# Patient Record
Sex: FEMALE | Race: WHITE | NOT HISPANIC OR LATINO | RURAL
[De-identification: names, ages, dates, MRNs, and addresses within clinical notes are randomized per-mention and may not be internally consistent; named-entity substitution may affect disease eponyms.]

---

## 2017-12-14 ENCOUNTER — APPOINTMENT (RX ONLY)
Dept: RURAL CLINIC 3 | Facility: CLINIC | Age: 82
Setting detail: DERMATOLOGY
End: 2017-12-14

## 2017-12-14 DIAGNOSIS — L21.8 OTHER SEBORRHEIC DERMATITIS: ICD-10-CM

## 2017-12-14 DIAGNOSIS — L30.9 DERMATITIS, UNSPECIFIED: ICD-10-CM

## 2017-12-14 PROCEDURE — 11100: CPT

## 2017-12-14 PROCEDURE — ? PRESCRIPTION

## 2017-12-14 PROCEDURE — ? EDUCATIONAL RESOURCES PROVIDED

## 2017-12-14 PROCEDURE — ? PATIENT SPECIFIC COUNSELING

## 2017-12-14 PROCEDURE — ? TREATMENT REGIMEN

## 2017-12-14 PROCEDURE — ? BIOPSY BY PUNCH METHOD

## 2017-12-14 PROCEDURE — ? OTHER

## 2017-12-14 PROCEDURE — ? COUNSELING

## 2017-12-14 PROCEDURE — 99202 OFFICE O/P NEW SF 15 MIN: CPT | Mod: 25

## 2017-12-14 RX ORDER — CLOBETASOL PROPIONATE 0.5 MG/G
CREAM TOPICAL
Qty: 1 | Refills: 1 | Status: ERX | COMMUNITY
Start: 2017-12-14

## 2017-12-14 RX ADMIN — CLOBETASOL PROPIONATE: 0.5 CREAM TOPICAL at 00:00

## 2017-12-14 ASSESSMENT — LOCATION DETAILED DESCRIPTION DERM
LOCATION DETAILED: RIGHT SUPERIOR MEDIAL UPPER BACK
LOCATION DETAILED: RIGHT PROXIMAL LATERAL POSTERIOR THIGH
LOCATION DETAILED: POSTERIOR MID-PARIETAL SCALP
LOCATION DETAILED: LEFT POSTERIOR SHOULDER
LOCATION DETAILED: RIGHT DISTAL POSTERIOR THIGH

## 2017-12-14 ASSESSMENT — LOCATION SIMPLE DESCRIPTION DERM
LOCATION SIMPLE: LEFT SHOULDER
LOCATION SIMPLE: RIGHT POSTERIOR THIGH
LOCATION SIMPLE: RIGHT UPPER BACK
LOCATION SIMPLE: POSTERIOR SCALP

## 2017-12-14 ASSESSMENT — LOCATION ZONE DERM
LOCATION ZONE: SCALP
LOCATION ZONE: TRUNK
LOCATION ZONE: LEG
LOCATION ZONE: ARM

## 2017-12-14 NOTE — PROCEDURE: OTHER
Detail Level: Zone
Other (Free Text): Recheck in 3 weeks.
Note Text (......Xxx Chief Complaint.): This diagnosis correlates with the

## 2017-12-14 NOTE — PROCEDURE: PATIENT SPECIFIC COUNSELING
Patient reports she has Clobetasol solution at home and it works well for her. She was educated she may use this hid x 2 weeks, then reduce to 1-2 x week for maintenance, if needed.
Detail Level: Simple

## 2017-12-14 NOTE — PROCEDURE: BIOPSY BY PUNCH METHOD
Bill For Surgical Tray: no
Detail Level: Detailed
Notification Instructions: Patient will be notified of biopsy results. However, patient instructed to call the office if not contacted within 2 weeks.
Hemostasis: None
Anesthesia Type: 2% lidocaine with epinephrine
Wound Care: Polysporin ointment
Biopsy Type: H and E
Suture Removal: 14 days
Additional Anesthesia Volume In Cc (Will Not Render If 0): 0
Path Notes (To The Dermatopathologist): Two specimens were taken from the same lesion and placed into one specimen container.
Dressing: bandage
Post-Care Instructions: I reviewed with the patient in detail post-care instructions. Patient is to keep the biopsy site dry overnight, and then apply bacitracin twice daily until healed. Patient may apply hydrogen peroxide soaks to remove any crusting.
Epidermal Sutures: 4-0 Nylon
Consent: Verbal consent was obtained from patient.
Punch Size In Mm: 4
Anesthesia Volume In Cc: 1
Home Suture Removal Text: Patient was provided a home suture removal kit and will remove their sutures at home.  If they have any questions or difficulties they will call the office.
Billing Type: Third-Party Bill

## 2017-12-14 NOTE — PROCEDURE: TREATMENT REGIMEN
Detail Level: Simple
Otc Regimen: Daily moisturization with bland cream
Initiate Treatment: Clobetasol as prescribed
Samples Given: VANICREAM: Skin Cream, product list

## 2018-01-02 ENCOUNTER — APPOINTMENT (RX ONLY)
Dept: RURAL CLINIC 3 | Facility: CLINIC | Age: 83
Setting detail: DERMATOLOGY
End: 2018-01-02

## 2018-01-02 DIAGNOSIS — L20.89 OTHER ATOPIC DERMATITIS: ICD-10-CM

## 2018-01-02 DIAGNOSIS — H61.03 CHONDRITIS OF EXTERNAL EAR: ICD-10-CM

## 2018-01-02 DIAGNOSIS — Z71.89 OTHER SPECIFIED COUNSELING: ICD-10-CM

## 2018-01-02 DIAGNOSIS — L82.1 OTHER SEBORRHEIC KERATOSIS: ICD-10-CM

## 2018-01-02 DIAGNOSIS — L30.9 DERMATITIS, UNSPECIFIED: ICD-10-CM

## 2018-01-02 PROBLEM — L20.84 INTRINSIC (ALLERGIC) ECZEMA: Status: ACTIVE | Noted: 2018-01-02

## 2018-01-02 PROBLEM — H61.032 CHONDRITIS OF LEFT EXTERNAL EAR: Status: ACTIVE | Noted: 2018-01-02

## 2018-01-02 PROCEDURE — ? PATHOLOGY DISCUSSION

## 2018-01-02 PROCEDURE — ? PATIENT SPECIFIC COUNSELING

## 2018-01-02 PROCEDURE — ? EDUCATIONAL RESOURCES PROVIDED

## 2018-01-02 PROCEDURE — ? TREATMENT REGIMEN

## 2018-01-02 PROCEDURE — ? OTHER

## 2018-01-02 PROCEDURE — ? COUNSELING

## 2018-01-02 PROCEDURE — 99213 OFFICE O/P EST LOW 20 MIN: CPT

## 2018-01-02 ASSESSMENT — LOCATION DETAILED DESCRIPTION DERM
LOCATION DETAILED: LEFT SUPERIOR UPPER BACK
LOCATION DETAILED: RIGHT SUPERIOR UPPER BACK
LOCATION DETAILED: LEFT SUPERIOR LATERAL UPPER BACK
LOCATION DETAILED: LEFT CAVUM CONCHA
LOCATION DETAILED: RIGHT CAVUM CONCHA
LOCATION DETAILED: LEFT SUPERIOR HELIX

## 2018-01-02 ASSESSMENT — LOCATION ZONE DERM
LOCATION ZONE: TRUNK
LOCATION ZONE: EAR

## 2018-01-02 ASSESSMENT — LOCATION SIMPLE DESCRIPTION DERM
LOCATION SIMPLE: RIGHT UPPER BACK
LOCATION SIMPLE: LEFT UPPER BACK
LOCATION SIMPLE: LEFT EAR
LOCATION SIMPLE: RIGHT EAR

## 2018-01-02 NOTE — PROCEDURE: TREATMENT REGIMEN
Modify Regimen: Increase Clobetasol to bid x 2 weeks to legs, then reduce to 1-2 x week. Use 1-2 x week on back
Plan: Return to clinic if rash does not resolve or worsens
Detail Level: Zone
Initiate Treatment: Daily moisturization with bland cream

## 2018-01-02 NOTE — PROCEDURE: OTHER
Detail Level: Simple
Other (Free Text): Patient’s dermatitis on her back is improved/resolved. The dermatitis on her legs is still active.
Note Text (......Xxx Chief Complaint.): This diagnosis correlates with the

## 2018-01-02 NOTE — PROCEDURE: PATIENT SPECIFIC COUNSELING
Patient educated this is not a cyst.
Detail Level: Zone
Patient educated to use over-the-counter cortisone 10 in her ears to see if that resolves the itch. If she does not have cortisone 10, she may use any strength of over-the-counter hydrocortisone. Return to clinic if no improvement.
Patient educated to return to clinic for any concerning lesions.
Detail Level: Simple

## 2018-01-15 ENCOUNTER — APPOINTMENT (RX ONLY)
Dept: RURAL CLINIC 3 | Facility: CLINIC | Age: 83
Setting detail: DERMATOLOGY
End: 2018-01-15

## 2018-01-15 DIAGNOSIS — L30.9 DERMATITIS, UNSPECIFIED: ICD-10-CM

## 2018-01-15 PROCEDURE — ? TREATMENT REGIMEN

## 2018-01-15 PROCEDURE — ? COUNSELING

## 2018-01-15 PROCEDURE — ? BIOPSY BY PUNCH METHOD

## 2018-01-15 PROCEDURE — ? PATIENT SPECIFIC COUNSELING

## 2018-01-15 PROCEDURE — ? OTHER

## 2018-01-15 PROCEDURE — 11100: CPT

## 2018-01-15 ASSESSMENT — LOCATION DETAILED DESCRIPTION DERM
LOCATION DETAILED: RIGHT MID-UPPER BACK
LOCATION DETAILED: RIGHT INFERIOR LATERAL UPPER BACK
LOCATION DETAILED: LEFT INFERIOR UPPER BACK
LOCATION DETAILED: RIGHT INFERIOR UPPER BACK

## 2018-01-15 ASSESSMENT — LOCATION SIMPLE DESCRIPTION DERM
LOCATION SIMPLE: RIGHT UPPER BACK
LOCATION SIMPLE: LEFT UPPER BACK

## 2018-01-15 ASSESSMENT — LOCATION ZONE DERM: LOCATION ZONE: TRUNK

## 2018-01-15 NOTE — PROCEDURE: PATIENT SPECIFIC COUNSELING
Detail Level: Detailed
Patient educated to return to clinic in 2 weeks for an office visit to go over biopsy results and remove sutures. Should she flare next week, she should call and ask to speak with one of Dr. Bull’s nurses as Italia is out of the clinic.

## 2018-01-15 NOTE — PROCEDURE: OTHER
Note Text (......Xxx Chief Complaint.): This diagnosis correlates with the
Other (Free Text): A KOH prep take from patient’s right lateral midback was negative for hyphae.
Detail Level: Simple
Other (Free Text): Two specimens were taken and placed into one specimen container. Each site was closed with one 4-0 suture.

## 2018-01-15 NOTE — PROCEDURE: TREATMENT REGIMEN
Initiate Treatment: Oral Allegra when patient receives it (she ordered it)
Continue Regimen: Benadry qhs
Discontinue Regimen: Clobetasol, topical Allegra, hydroxyzine (patient educated it interacts with Benadryl). Avoid wearing bra as much as possible
Detail Level: Zone

## 2018-01-15 NOTE — PROCEDURE: BIOPSY BY PUNCH METHOD
Suture Removal: 14 days
Punch Size In Mm: 4
Additional Anesthesia Volume In Cc (Will Not Render If 0): 0
Bill 69870 For Specimen Handling/Conveyance To Laboratory?: no
Consent: Verbal consent was obtained from patient.
Anesthesia Type: 2% lidocaine with epinephrine
Anesthesia Volume In Cc: 1
Epidermal Sutures: 4-0 Nylon
Biopsy Type: H and E
Path Notes (To The Dermatopathologist): Rash worsened with Clobetasol. Patient had prior biopsy on 12/14/17. Two specimens were taken and placed into one specimen container.
Post-Care Instructions: I reviewed with the patient in detail post-care instructions. Patient is to keep the biopsy site dry overnight, and then apply bacitracin twice daily until healed. Patient may apply hydrogen peroxide soaks to remove any crusting.
Hemostasis: None
Dressing: bandage
Billing Type: Third-Party Bill
Notification Instructions: Patient will be notified of biopsy results. However, patient instructed to call the office if not contacted within 2 weeks.
Home Suture Removal Text: Patient was provided a home suture removal kit and will remove their sutures at home.  If they have any questions or difficulties they will call the office.
Detail Level: Detailed
Wound Care: Polysporin ointment

## 2018-01-18 ENCOUNTER — APPOINTMENT (RX ONLY)
Dept: RURAL CLINIC 3 | Facility: CLINIC | Age: 83
Setting detail: DERMATOLOGY
End: 2018-01-18

## 2018-01-18 DIAGNOSIS — L30.9 DERMATITIS, UNSPECIFIED: ICD-10-CM

## 2018-01-18 PROCEDURE — ? PATIENT SPECIFIC COUNSELING

## 2018-01-18 PROCEDURE — ? OTHER

## 2018-01-18 PROCEDURE — 99213 OFFICE O/P EST LOW 20 MIN: CPT

## 2018-01-18 PROCEDURE — ? TREATMENT REGIMEN

## 2018-01-18 PROCEDURE — ? ORDER TESTS

## 2018-01-18 PROCEDURE — ? COUNSELING

## 2018-01-18 ASSESSMENT — LOCATION DETAILED DESCRIPTION DERM
LOCATION DETAILED: RIGHT INFERIOR LATERAL UPPER BACK
LOCATION DETAILED: LEFT INFERIOR UPPER BACK
LOCATION DETAILED: RIGHT INFERIOR UPPER BACK
LOCATION DETAILED: RIGHT ANTERIOR SHOULDER

## 2018-01-18 ASSESSMENT — LOCATION ZONE DERM
LOCATION ZONE: TRUNK
LOCATION ZONE: ARM

## 2018-01-18 ASSESSMENT — LOCATION SIMPLE DESCRIPTION DERM
LOCATION SIMPLE: RIGHT UPPER BACK
LOCATION SIMPLE: RIGHT SHOULDER
LOCATION SIMPLE: LEFT UPPER BACK

## 2018-01-18 NOTE — PROCEDURE: OTHER
Other (Free Text): [facility] was called by Italia and asked to add “Urticarial Drug Eruption” to the differential diagnosis on patient’s 1/15/18 right back biopsy. A written request was faxed as well (copy scanned into chart).
Detail Level: Detailed
Note Text (......Xxx Chief Complaint.): This diagnosis correlates with the
Other (Free Text): Patient would like copies of her chart notes sent to [facility] Garcia will do this.

## 2018-01-18 NOTE — PROCEDURE: PATIENT SPECIFIC COUNSELING
Detail Level: Detailed
Patient aware LB is out of the office next week. Should patient flare while LB is gone, she should call the clinic. Dr. Bull will be in the office next week and also saw patient today.

## 2018-01-18 NOTE — PROCEDURE: TREATMENT REGIMEN
Continue Regimen: Allegra, one PO qd
Discontinue Regimen: Minocycline
Initiate Treatment: (Restart) clobetasol cream, AAA bid prn
Detail Level: Zone

## 2018-01-31 ENCOUNTER — APPOINTMENT (RX ONLY)
Dept: RURAL CLINIC 3 | Facility: CLINIC | Age: 83
Setting detail: DERMATOLOGY
End: 2018-01-31

## 2018-01-31 DIAGNOSIS — L27.0 GENERALIZED SKIN ERUPTION DUE TO DRUGS AND MEDICAMENTS TAKEN INTERNALLY: ICD-10-CM

## 2018-01-31 DIAGNOSIS — L28.1 PRURIGO NODULARIS: ICD-10-CM

## 2018-01-31 DIAGNOSIS — L21.8 OTHER SEBORRHEIC DERMATITIS: ICD-10-CM

## 2018-01-31 DIAGNOSIS — L73.8 OTHER SPECIFIED FOLLICULAR DISORDERS: ICD-10-CM

## 2018-01-31 PROCEDURE — ? OTHER

## 2018-01-31 PROCEDURE — ? PATIENT SPECIFIC COUNSELING

## 2018-01-31 PROCEDURE — 99213 OFFICE O/P EST LOW 20 MIN: CPT

## 2018-01-31 PROCEDURE — ? TREATMENT REGIMEN

## 2018-01-31 PROCEDURE — ? COUNSELING

## 2018-01-31 PROCEDURE — ? PATHOLOGY DISCUSSION

## 2018-01-31 ASSESSMENT — LOCATION SIMPLE DESCRIPTION DERM
LOCATION SIMPLE: RIGHT UPPER BACK
LOCATION SIMPLE: POSTERIOR SCALP
LOCATION SIMPLE: LEFT EAR
LOCATION SIMPLE: NOSE
LOCATION SIMPLE: POSTERIOR NECK

## 2018-01-31 ASSESSMENT — LOCATION ZONE DERM
LOCATION ZONE: NECK
LOCATION ZONE: TRUNK
LOCATION ZONE: SCALP
LOCATION ZONE: NOSE
LOCATION ZONE: EAR

## 2018-01-31 ASSESSMENT — LOCATION DETAILED DESCRIPTION DERM
LOCATION DETAILED: POSTERIOR MID-PARIETAL SCALP
LOCATION DETAILED: LEFT CAVUM CONCHA
LOCATION DETAILED: NASAL SUPRATIP
LOCATION DETAILED: LEFT SUPERIOR POSTERIOR NECK
LOCATION DETAILED: RIGHT INFERIOR UPPER BACK

## 2018-01-31 NOTE — PROCEDURE: TREATMENT REGIMEN
Detail Level: Zone
Continue Regimen: Allegra (topical, oral) prn. Daily moisturization with a bland cream
Discontinue Regimen: Minocycline, clobetasol

## 2018-01-31 NOTE — PROCEDURE: OTHER
Note Text (......Xxx Chief Complaint.): This diagnosis correlates with the
Detail Level: Detailed
Other (Free Text): Two sutures were removed.

## 2018-01-31 NOTE — PROCEDURE: PATIENT SPECIFIC COUNSELING
Patient’s scalp is improved. Her left ear still has a small amount if greasy scale in the eva cavum. She reports it itches but not as much as it used to since she has been putting over-the-counter cortisone on it. Recommend she continue over-the-counter cortisone bid x 2 weeks with flares, the. 1-2 x week for maintenance.
Detail Level: Detailed
Patient educated she is allergic to minocycline and to be sure she notifies her other providers of this. Her rash has mostly resolved with post-inflammatory change remaining. She will get a small patch of urticaria when she scratches her right lateral lower mid back. She was reassured this will resolve in time and to try to refrain from scratching. She reports Clobetasol doesn’t help it much but Allegra cream does. She may use Allegra cream prn. Also reviewed patient’s bloodwork. Most of it is normal or near normal with the exception of her TSH, which is elevated. Patient educated to follow-up with her primary care provider or Dr. Royal (patient reports Dr. Royal manages her thyroid) for further evaluation and management. Patient has an appointment with Dr. Royal tomorrow.
Detail Level: Simple

## 2023-05-16 ENCOUNTER — APPOINTMENT (RX ONLY)
Dept: RURAL CLINIC 2 | Facility: CLINIC | Age: 88
Setting detail: DERMATOLOGY
End: 2023-05-16

## 2023-05-16 DIAGNOSIS — L08.9 LOCAL INFECTION OF THE SKIN AND SUBCUTANEOUS TISSUE, UNSPECIFIED: ICD-10-CM

## 2023-05-16 DIAGNOSIS — L30.9 DERMATITIS, UNSPECIFIED: ICD-10-CM

## 2023-05-16 DIAGNOSIS — R20.2 PARESTHESIA OF SKIN: ICD-10-CM

## 2023-05-16 DIAGNOSIS — L259 CONTACT DERMATITIS AND OTHER ECZEMA, UNSPECIFIED CAUSE: ICD-10-CM

## 2023-05-16 PROBLEM — L30.8 OTHER SPECIFIED DERMATITIS: Status: ACTIVE | Noted: 2023-05-16

## 2023-05-16 PROCEDURE — ? OTHER

## 2023-05-16 PROCEDURE — ? PRESCRIPTION

## 2023-05-16 PROCEDURE — ? ADDITIONAL NOTES

## 2023-05-16 PROCEDURE — ? COUNSELING

## 2023-05-16 PROCEDURE — 99203 OFFICE O/P NEW LOW 30 MIN: CPT

## 2023-05-16 PROCEDURE — ? ORDER TESTS

## 2023-05-16 PROCEDURE — ? EDUCATIONAL RESOURCES PROVIDED

## 2023-05-16 PROCEDURE — ? PRESCRIPTION MEDICATION MANAGEMENT

## 2023-05-16 RX ORDER — CEPHALEXIN 500 MG/1
TABLET ORAL BID
Qty: 20 | Refills: 0 | Status: ERX | COMMUNITY
Start: 2023-05-16

## 2023-05-16 RX ORDER — TRIAMCINOLONE ACETONIDE 1 MG/G
CREAM TOPICAL
Qty: 160 | Refills: 0 | Status: ERX | COMMUNITY
Start: 2023-05-16

## 2023-05-16 RX ADMIN — TRIAMCINOLONE ACETONIDE: 1 CREAM TOPICAL at 00:00

## 2023-05-16 RX ADMIN — CEPHALEXIN: 500 TABLET ORAL at 00:00

## 2023-05-16 ASSESSMENT — LOCATION SIMPLE DESCRIPTION DERM
LOCATION SIMPLE: LEFT FOREARM
LOCATION SIMPLE: RIGHT THIGH
LOCATION SIMPLE: UPPER BACK
LOCATION SIMPLE: LEFT INDEX FINGER
LOCATION SIMPLE: RIGHT UPPER BACK
LOCATION SIMPLE: LOWER BACK

## 2023-05-16 ASSESSMENT — LOCATION DETAILED DESCRIPTION DERM
LOCATION DETAILED: RIGHT ANTERIOR LATERAL PROXIMAL THIGH
LOCATION DETAILED: LEFT INDEX FINGER PROXIMAL INTERPHALANGEAL JOINT
LOCATION DETAILED: SUPERIOR THORACIC SPINE
LOCATION DETAILED: LEFT DISTAL DORSAL FOREARM
LOCATION DETAILED: RIGHT SUPERIOR MEDIAL UPPER BACK
LOCATION DETAILED: SUPERIOR LUMBAR SPINE

## 2023-05-16 ASSESSMENT — LOCATION ZONE DERM
LOCATION ZONE: TRUNK
LOCATION ZONE: FINGER
LOCATION ZONE: LEG
LOCATION ZONE: ARM

## 2023-05-16 NOTE — PROCEDURE: ADDITIONAL NOTES
Render Risk Assessment In Note?: no
Additional Notes: Patient was not aware she had a rash on her back. It is asymptomatic. Recommend triamcinolone cream and re-examine at patient's follow-up in 1 week.
Detail Level: Simple

## 2023-05-16 NOTE — PROCEDURE: ORDER TESTS
Bill For Surgical Tray: no
Performing Laboratory: 0
Expected Date Of Service: 05/16/2023
Billing Type: Third-Party Bill

## 2023-05-16 NOTE — PROCEDURE: PRESCRIPTION MEDICATION MANAGEMENT
Plan: Recheck in 1 week
Detail Level: Zone
Initiate Treatment: Triamcinolone as prescribed. Patient also instructed to use it on her left forearm
Render In Strict Bullet Format?: No
Plan: Recheck in 1 week.
Initiate Treatment: Cephalexin as prescribed (of note, patient reports she has taken cephalexin in the past without any issues)

## 2023-05-16 NOTE — PROCEDURE: OTHER
Other (Free Text): Dr. Bull also examined patient's rash.
Note Text (......Xxx Chief Complaint.): This diagnosis correlates with the
Render Risk Assessment In Note?: no
Detail Level: Detailed
Other (Free Text): Dr. Bull also examined this lesion.

## 2023-05-16 NOTE — HPI: RASH
Is This A New Presentation, Or A Follow-Up?: Rash
Additional History: The patient had an intestinal surgery in December 29, 2023. The patients most recent medication is Valacyclovir. She also reports that she recently had steroid injections.

## 2023-05-24 ENCOUNTER — APPOINTMENT (RX ONLY)
Dept: RURAL CLINIC 2 | Facility: CLINIC | Age: 88
Setting detail: DERMATOLOGY
End: 2023-05-24

## 2023-05-24 DIAGNOSIS — L30.9 DERMATITIS, UNSPECIFIED: ICD-10-CM

## 2023-05-24 DIAGNOSIS — R20.2 PARESTHESIA OF SKIN: ICD-10-CM

## 2023-05-24 DIAGNOSIS — L08.9 LOCAL INFECTION OF THE SKIN AND SUBCUTANEOUS TISSUE, UNSPECIFIED: ICD-10-CM | Status: INADEQUATELY CONTROLLED

## 2023-05-24 PROCEDURE — ? PRESCRIPTION

## 2023-05-24 PROCEDURE — ? PRESCRIPTION MEDICATION MANAGEMENT

## 2023-05-24 PROCEDURE — ? COUNSELING

## 2023-05-24 PROCEDURE — ? OTHER

## 2023-05-24 PROCEDURE — ? ADDITIONAL NOTES

## 2023-05-24 PROCEDURE — 99214 OFFICE O/P EST MOD 30 MIN: CPT

## 2023-05-24 RX ORDER — CEPHALEXIN 500 MG/1
TABLET ORAL BID
Qty: 40 | Refills: 0 | Status: ERX

## 2023-05-24 RX ORDER — CLOBETASOL PROPIONATE 0.5 MG/G
OINTMENT TOPICAL
Qty: 45 | Refills: 0 | Status: ERX | COMMUNITY
Start: 2023-05-24

## 2023-05-24 RX ORDER — MUPIROCIN 20 MG/G
OINTMENT TOPICAL
Qty: 15 | Refills: 0 | Status: ERX | COMMUNITY
Start: 2023-05-24

## 2023-05-24 RX ADMIN — CLOBETASOL PROPIONATE: 0.5 OINTMENT TOPICAL at 00:00

## 2023-05-24 RX ADMIN — MUPIROCIN: 20 OINTMENT TOPICAL at 00:00

## 2023-05-24 ASSESSMENT — LOCATION SIMPLE DESCRIPTION DERM
LOCATION SIMPLE: LEFT UPPER BACK
LOCATION SIMPLE: LEFT FOREARM
LOCATION SIMPLE: RIGHT UPPER BACK
LOCATION SIMPLE: RIGHT THIGH

## 2023-05-24 ASSESSMENT — LOCATION ZONE DERM
LOCATION ZONE: TRUNK
LOCATION ZONE: ARM
LOCATION ZONE: LEG

## 2023-05-24 ASSESSMENT — LOCATION DETAILED DESCRIPTION DERM
LOCATION DETAILED: RIGHT ANTERIOR DISTAL THIGH
LOCATION DETAILED: LEFT PROXIMAL DORSAL FOREARM
LOCATION DETAILED: RIGHT SUPERIOR MEDIAL UPPER BACK
LOCATION DETAILED: LEFT INFERIOR MEDIAL UPPER BACK

## 2023-05-24 NOTE — HPI: FOLLOW UP OTHER
What Is Your Reason For Requesting A Follow-Up Appointment?: Follow up from May 16, 2023. Rash by scapula is flared.

## 2023-05-24 NOTE — PROCEDURE: OTHER
Detail Level: Detailed
Other (Free Text): Recheck in 2 weeks.
Note Text (......Xxx Chief Complaint.): This diagnosis correlates with the
Render Risk Assessment In Note?: no
Other (Free Text): Dr. Lomas also examined patient's finger. Because patient's culture did not show MRSA, recommend increasing her cephalexin and starting Mupirocin ointment.

## 2023-05-24 NOTE — PROCEDURE: PRESCRIPTION MEDICATION MANAGEMENT
Initiate Treatment: Cephalexin and Mupirocin as prescribed
Detail Level: Zone
Render In Strict Bullet Format?: No

## 2023-05-24 NOTE — PROCEDURE: ADDITIONAL NOTES
Render Risk Assessment In Note?: no
Detail Level: Simple
Additional Notes: Mupirocin was prescribed for the skin infection nos, below, not for this dermatitis.
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 The rash on her leg and forearm, as well as the skin infection on her finger, are more acute conditions and required time to address. Will discuss notalgia paresthetica at her next office visit. Patient has had the itch by her right scapula for years.
Additional Notes: Mupirocin was prescribed for this skin infection, not for the dermatitis unspecified above.
Additional Notes: Patient's May 16, 2023 bacterial culture results have not been received. Both LIAM and DONAVAN called [facility] requesting the results while patient was in the exam room. After still not receiving them, LB called [facility] again and spoke with Natasha in microbiology (patient is still in exam room). She gave a verbal report that patient's culture grew staph aureus, not MRSA, and that the staph is susceptible to every antibiotic they test. Patient informed. Natasha will resend labs.

## 2023-05-24 NOTE — PROCEDURE: COUNSELING
Detail Level: Detailed
Detail Level: Simple
Patient Specific Counseling (Will Not Stick From Patient To Patient): Discussed with patient the possibility that her back rash, right lateral thigh rash, and left forearm rash may all represent the same entity. As patient's back rash is not symptomatic and she was not aware of its presence recommend no treatment. Regarding the rash on her right thigh and left forearm, discussed biopsy versus increasing the strength of the topical steroid and consider biopsy if no improvement. Patient opts for empiric treatment. Will plan on re-checking in 2 weeks.

## 2023-06-07 ENCOUNTER — APPOINTMENT (RX ONLY)
Dept: RURAL CLINIC 2 | Facility: CLINIC | Age: 88
Setting detail: DERMATOLOGY
End: 2023-06-07

## 2023-06-07 DIAGNOSIS — L30.9 DERMATITIS, UNSPECIFIED: ICD-10-CM

## 2023-06-07 DIAGNOSIS — R20.2 PARESTHESIA OF SKIN: ICD-10-CM

## 2023-06-07 DIAGNOSIS — L08.9 LOCAL INFECTION OF THE SKIN AND SUBCUTANEOUS TISSUE, UNSPECIFIED: ICD-10-CM

## 2023-06-07 PROCEDURE — ? OTHER

## 2023-06-07 PROCEDURE — ? EDUCATIONAL RESOURCES PROVIDED

## 2023-06-07 PROCEDURE — ? COUNSELING

## 2023-06-07 PROCEDURE — 99213 OFFICE O/P EST LOW 20 MIN: CPT

## 2023-06-07 PROCEDURE — ? PRESCRIPTION MEDICATION MANAGEMENT

## 2023-06-07 ASSESSMENT — ITCH NUMERIC RATING SCALE: HOW SEVERE IS YOUR ITCHING?: 3

## 2023-06-07 ASSESSMENT — LOCATION SIMPLE DESCRIPTION DERM
LOCATION SIMPLE: LEFT FOREARM
LOCATION SIMPLE: RIGHT THIGH
LOCATION SIMPLE: RIGHT UPPER BACK

## 2023-06-07 ASSESSMENT — LOCATION DETAILED DESCRIPTION DERM
LOCATION DETAILED: RIGHT SUPERIOR UPPER BACK
LOCATION DETAILED: LEFT DISTAL DORSAL FOREARM
LOCATION DETAILED: RIGHT ANTERIOR LATERAL PROXIMAL THIGH

## 2023-06-07 ASSESSMENT — LOCATION ZONE DERM
LOCATION ZONE: TRUNK
LOCATION ZONE: ARM
LOCATION ZONE: LEG

## 2023-06-07 NOTE — PROCEDURE: COUNSELING
Detail Level: Simple
Patient Specific Counseling (Will Not Stick From Patient To Patient): Patient's rash on her right thigh is improved and only post-Inflammatory change remains. Patient instructed to moisturize daily with a bland cream.
Patient Specific Counseling (Will Not Stick From Patient To Patient): This is resolving. Recommend patient continue mupirocin ointment until area is healed. Contact office if Infection fails to continue to resolve or if it worsens.
Detail Level: Detailed
Patient Specific Counseling (Will Not Stick From Patient To Patient): Discussed that most of these lesions appear to be spitting sutures which represent an inflammatory reaction to subcutaneous sutures. Also discussed that it is unusual for such a delayed response (patient had her surgery in the 1990s).\\nRecommend patient reduce clobetasol from twice daily to three times weekly. Patient instructed to moisturize daily with a bland cream. Will plan on re-checking left forearm In six weeks. She has one lesion on the medial aspect of her left forearm that may represent an actinic keratosis.

## 2023-06-07 NOTE — PROCEDURE: OTHER
Detail Level: Zone
Other (Free Text): Ml [place] also examined patient's left forearm.
Render Risk Assessment In Note?: no
Note Text (......Xxx Chief Complaint.): This diagnosis correlates with the
normal/regular rate and rhythm/S1 S2 present/no gallops/no rub/no murmur

## 2023-06-07 NOTE — PROCEDURE: PRESCRIPTION MEDICATION MANAGEMENT
Detail Level: Zone
Continue Regimen: Mupirocin three times daily until healed. Cover with a bandage at night to help avoid rubbing on sheets
Render In Strict Bullet Format?: No
Modify Regimen: Reduce clobetasol from twice daily to three times weekly

## 2023-07-19 ENCOUNTER — APPOINTMENT (RX ONLY)
Dept: RURAL CLINIC 2 | Facility: CLINIC | Age: 88
Setting detail: DERMATOLOGY
End: 2023-07-19

## 2023-07-19 DIAGNOSIS — R20.2 PARESTHESIA OF SKIN: ICD-10-CM

## 2023-07-19 DIAGNOSIS — L08.9 LOCAL INFECTION OF THE SKIN AND SUBCUTANEOUS TISSUE, UNSPECIFIED: ICD-10-CM | Status: RESOLVED

## 2023-07-19 DIAGNOSIS — L82.0 INFLAMED SEBORRHEIC KERATOSIS: ICD-10-CM

## 2023-07-19 DIAGNOSIS — L30.9 DERMATITIS, UNSPECIFIED: ICD-10-CM | Status: RESOLVED

## 2023-07-19 PROCEDURE — 11104 PUNCH BX SKIN SINGLE LESION: CPT

## 2023-07-19 PROCEDURE — ? EDUCATIONAL RESOURCES PROVIDED

## 2023-07-19 PROCEDURE — ? ADDITIONAL NOTES

## 2023-07-19 PROCEDURE — ? OTHER

## 2023-07-19 PROCEDURE — ? ORDER TESTS

## 2023-07-19 PROCEDURE — 99213 OFFICE O/P EST LOW 20 MIN: CPT | Mod: 25

## 2023-07-19 PROCEDURE — ? BIOPSY BY PUNCH METHOD

## 2023-07-19 PROCEDURE — ? COUNSELING

## 2023-07-19 PROCEDURE — ? PRESCRIPTION MEDICATION MANAGEMENT

## 2023-07-19 ASSESSMENT — LOCATION ZONE DERM
LOCATION ZONE: ARM
LOCATION ZONE: TRUNK
LOCATION ZONE: LEG

## 2023-07-19 ASSESSMENT — LOCATION DETAILED DESCRIPTION DERM
LOCATION DETAILED: LEFT DISTAL DORSAL FOREARM
LOCATION DETAILED: RIGHT ANTERIOR LATERAL PROXIMAL THIGH
LOCATION DETAILED: RIGHT SUPERIOR UPPER BACK
LOCATION DETAILED: RIGHT MID-UPPER BACK

## 2023-07-19 ASSESSMENT — LOCATION SIMPLE DESCRIPTION DERM
LOCATION SIMPLE: LEFT FOREARM
LOCATION SIMPLE: RIGHT THIGH
LOCATION SIMPLE: RIGHT UPPER BACK

## 2023-07-19 NOTE — PROCEDURE: ADDITIONAL NOTES
Detail Level: Simple
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 Discussed liquid nitrogen. While LB  had intended to freeze them, patient's dermatitis that was biopsied and cultured today took presedence These can be frozen at patient's next office visit if she still desires.
Render Risk Assessment In Note?: no

## 2023-07-19 NOTE — HPI: FOLLOW UP OTHER
What Is Your Reason For Requesting A Follow-Up Appointment?: From July 7, 2023. Notalgia Paresthetica\\nPatient exhibits scapular pruritus\\nlocated on the right upper back.\\nPlan: Counseling.\\nI counseled the patient regarding the following:\\nSkin care: Avoid scratching the area if possible. Apply ice or Sarna with menthol to the area to decrease pruritus.\\nRegular topical application of over the counter capsaicin cream can help over time.\\nExpectations: Notalgia paresthetica is a relatively common condition which causes localized itching or mild pain\\nnear the scapula. It can occur secondary to nerve problems, often associated with neck pathology

## 2023-07-19 NOTE — PROCEDURE: OTHER
Detail Level: Detailed
Other (Free Text): Dr. Lomas also examined patient's rash.
Render Risk Assessment In Note?: no
Note Text (......Xxx Chief Complaint.): This diagnosis correlates with the
Link To The Follow Chief Complaint: third
Link To The Follow Chief Complaint: second
Link To The Follow Chief Complaint: first
Other (Free Text): Follow-up in 2 weeks.

## 2023-07-19 NOTE — PROCEDURE: COUNSELING
Detail Level: Simple
Patient Specific Counseling (Will Not Stick From Patient To Patient): This is resolved. Patient instructed to moisturize daily with a bland cream.
Patient Specific Counseling (Will Not Stick From Patient To Patient): This is completely resolved. No further treatment needed today.
Detail Level: Detailed

## 2023-07-19 NOTE — PROCEDURE: BIOPSY BY PUNCH METHOD
Detail Level: Detailed
Was A Bandage Applied: Yes
Punch Size In Mm: 4
Size Of Lesion In Cm (Optional): 0
Depth Of Punch Biopsy: dermis
Biopsy Type: H and E
Anesthesia Type: 1% lidocaine with epinephrine
Anesthesia Volume In Cc: 0.5
Hemostasis: None
Epidermal Sutures: 4-0 Nylon
Number Of Epidermal Sutures (Optional): 1
Wound Care: Petrolatum
Dressing: bandage
Suture Removal: 14 days
Patient Will Remove Sutures At Home?: No
Lab: [id number]
Lab Facility: [id number]
Consent: Written consent was obtained.
Post-Care Instructions: Patient given written wound care instructions
Home Suture Removal Text: Patient will remove their sutures at home.  If they have any questions or difficulties they will call the office.
Notification Instructions: Patient will be notified of biopsy results.
Billing Type: Third-Party Bill
Information: Selecting Yes will display possible errors in your note based on the variables you have selected. This validation is only offered as a suggestion for you. PLEASE NOTE THAT THE VALIDATION TEXT WILL BE REMOVED WHEN YOU FINALIZE YOUR NOTE. IF YOU WANT TO FAX A PRELIMINARY NOTE YOU WILL NEED TO TOGGLE THIS TO 'NO' IF YOU DO NOT WANT IT IN YOUR FAXED NOTE.

## 2023-08-04 ENCOUNTER — APPOINTMENT (RX ONLY)
Dept: RURAL CLINIC 2 | Facility: CLINIC | Age: 88
Setting detail: DERMATOLOGY
End: 2023-08-04

## 2023-08-04 DIAGNOSIS — Z48.02 ENCOUNTER FOR REMOVAL OF SUTURES: ICD-10-CM

## 2023-08-04 PROCEDURE — ? SUTURE REMOVAL (GLOBAL PERIOD)

## 2023-08-04 ASSESSMENT — LOCATION ZONE DERM: LOCATION ZONE: ARM

## 2023-08-04 ASSESSMENT — LOCATION SIMPLE DESCRIPTION DERM: LOCATION SIMPLE: LEFT FOREARM

## 2023-08-04 ASSESSMENT — LOCATION DETAILED DESCRIPTION DERM: LOCATION DETAILED: LEFT DISTAL DORSAL FOREARM

## 2023-08-04 NOTE — PROCEDURE: SUTURE REMOVAL (GLOBAL PERIOD)
Detail Level: Detailed
Add 38680 Cpt? (Important Note: In 2017 The Use Of 37572 Is Being Tracked By Cms To Determine Future Global Period Reimbursement For Global Periods): no

## 2023-08-30 ENCOUNTER — APPOINTMENT (RX ONLY)
Dept: RURAL CLINIC 2 | Facility: CLINIC | Age: 88
Setting detail: DERMATOLOGY
End: 2023-08-30

## 2023-08-30 DIAGNOSIS — L30.9 DERMATITIS, UNSPECIFIED: ICD-10-CM

## 2023-08-30 PROCEDURE — 11105 PUNCH BX SKIN EA SEP/ADDL: CPT

## 2023-08-30 PROCEDURE — ? BIOPSY BY PUNCH METHOD

## 2023-08-30 PROCEDURE — ? TREATMENT REGIMEN

## 2023-08-30 PROCEDURE — ? COUNSELING

## 2023-08-30 PROCEDURE — ? ADDITIONAL NOTES

## 2023-08-30 PROCEDURE — 11104 PUNCH BX SKIN SINGLE LESION: CPT

## 2023-08-30 ASSESSMENT — LOCATION DETAILED DESCRIPTION DERM
LOCATION DETAILED: RIGHT ULNAR DORSAL HAND
LOCATION DETAILED: RIGHT ELBOW
LOCATION DETAILED: LEFT DORSAL INDEX METACARPOPHALANGEAL JOINT
LOCATION DETAILED: LEFT RADIAL DORSAL HAND
LOCATION DETAILED: LEFT ELBOW
LOCATION DETAILED: LEFT ULNAR DORSAL HAND

## 2023-08-30 ASSESSMENT — LOCATION ZONE DERM
LOCATION ZONE: HAND
LOCATION ZONE: ARM

## 2023-08-30 ASSESSMENT — LOCATION SIMPLE DESCRIPTION DERM
LOCATION SIMPLE: RIGHT HAND
LOCATION SIMPLE: RIGHT ELBOW
LOCATION SIMPLE: LEFT HAND
LOCATION SIMPLE: LEFT ELBOW

## 2023-08-30 NOTE — PROCEDURE: TREATMENT REGIMEN
Samples Given: Aquaphor, Vanicream ointment, White petroleum
Detail Level: Zone
Initiate Treatment: Aquaphor or Vanicream ointment or White petroleum at least every day

## 2023-08-30 NOTE — PROCEDURE: ADDITIONAL NOTES
Detail Level: Simple
Render Risk Assessment In Note?: no
Additional Notes: Patient reports she has \"abrasions\" and similar spots on her hands and elbows. She reports the ones on her elbows are where she bumps against walls. Clinically, there look the same as the lesions biopsied today.

## 2023-08-30 NOTE — PROCEDURE: BIOPSY BY PUNCH METHOD
Detail Level: Detailed
Was A Bandage Applied: Yes
Punch Size In Mm: 6
Size Of Lesion In Cm (Optional): 0
Depth Of Punch Biopsy: dermis
Biopsy Type: H and E
Anesthesia Type: 1% lidocaine with epinephrine
Anesthesia Volume In Cc: 0.5
Hemostasis: None
Epidermal Sutures: 4-0 Nylon
Wound Care: Petrolatum
Dressing: bandage
Suture Removal: 12 days
Patient Will Remove Sutures At Home?: No
Lab: [id number]
Lab Facility: [id number]
Consent: Written consent was obtained.
Post-Care Instructions: Patient given written wound care instructions
Home Suture Removal Text: Patient will remove their sutures at home.  If they have any questions or difficulties they will call the office.
Notification Instructions: Patient will be notified of biopsy results.
Billing Type: Third-Party Bill
Information: Selecting Yes will display possible errors in your note based on the variables you have selected. This validation is only offered as a suggestion for you. PLEASE NOTE THAT THE VALIDATION TEXT WILL BE REMOVED WHEN YOU FINALIZE YOUR NOTE. IF YOU WANT TO FAX A PRELIMINARY NOTE YOU WILL NEED TO TOGGLE THIS TO 'NO' IF YOU DO NOT WANT IT IN YOUR FAXED NOTE.

## 2023-08-30 NOTE — PROCEDURE: COUNSELING
Patient Specific Counseling (Will Not Stick From Patient To Patient): Discussed the possibility of reactive perforating collagenosis and potential treatment if confirmed by biopsy.
Detail Level: Zone

## 2023-09-12 ENCOUNTER — APPOINTMENT (RX ONLY)
Dept: RURAL CLINIC 2 | Facility: CLINIC | Age: 88
Setting detail: DERMATOLOGY
End: 2023-09-12

## 2023-09-12 DIAGNOSIS — L87.1 REACTIVE PERFORATING COLLAGENOSIS: ICD-10-CM

## 2023-09-12 PROCEDURE — ? COUNSELING

## 2023-09-12 PROCEDURE — ? OTHER

## 2023-09-12 PROCEDURE — 99214 OFFICE O/P EST MOD 30 MIN: CPT

## 2023-09-12 NOTE — PROCEDURE: OTHER
Detail Level: Detailed
Other (Free Text): Patient states she has been on leflunomide and hydroxychloroquine for about one year. The most recent medication the patient started is sulfasalazine, which was stared after her rash appeared. Discussed patient's pathology. Patient counseled that Reactive Perforating Collagenosis (RPC) is a rare entity. Discussed that it is usually in association with diabetes or kidney disease, neither of which patient has. Discussed the possibility that her RPC may be drug induced. If it is, the most likely causative drug is leflunomide. Discussed koebnerization. Patient has developed these lesions in old scars (from the 1990's) and in recent areas where she has had trauma (scratching her forearm, rubbing her fingers against things, scraping her elbows against walls). Discussed that treatment can be difficult and treatment options can include (but are not limited to) cryotherapy, emollients, topical keratolytics, topical steroids (these did not work for patient) tretinon, oral retinoids, and methotrexate. Patient's RPC seems to be responding to three times daily use of Vaseline. Recommend she continue this. Also recommend she purchase knee and elbow protectors as avoiding trauma to the skin is the mainstay of therapy. Discussed that patient may want to consider moving into an assisted living facility or hire home health care so she can have assistance putting protectors and Vaseline on. For now, as lesions appear to be responding to Vaseline (and with the hopeful avoidance of future trauma) will not recommend that patient stop leflunomide. However, if her lesions fail to continue to respond to therapy and/or if she develops news lesions, then discontinuing leflunomide may need to be considered.
Note Text (......Xxx Chief Complaint.): This diagnosis correlates with the
Render Risk Assessment In Note?: no

## 2023-11-21 ENCOUNTER — APPOINTMENT (RX ONLY)
Dept: RURAL CLINIC 2 | Facility: CLINIC | Age: 88
Setting detail: DERMATOLOGY
End: 2023-11-21

## 2023-11-21 DIAGNOSIS — D18.0 HEMANGIOMA: ICD-10-CM

## 2023-11-21 DIAGNOSIS — L82.1 OTHER SEBORRHEIC KERATOSIS: ICD-10-CM

## 2023-11-21 DIAGNOSIS — L87.1 REACTIVE PERFORATING COLLAGENOSIS: ICD-10-CM

## 2023-11-21 DIAGNOSIS — R20.2 PARESTHESIA OF SKIN: ICD-10-CM

## 2023-11-21 PROBLEM — D18.01 HEMANGIOMA OF SKIN AND SUBCUTANEOUS TISSUE: Status: ACTIVE | Noted: 2023-11-21

## 2023-11-21 PROCEDURE — 99213 OFFICE O/P EST LOW 20 MIN: CPT

## 2023-11-21 PROCEDURE — ? ADDITIONAL NOTES

## 2023-11-21 PROCEDURE — ? TREATMENT REGIMEN

## 2023-11-21 PROCEDURE — ? COUNSELING

## 2023-11-21 ASSESSMENT — LOCATION DETAILED DESCRIPTION DERM
LOCATION DETAILED: RIGHT SUPERIOR UPPER BACK
LOCATION DETAILED: SUPERIOR LUMBAR SPINE
LOCATION DETAILED: INFERIOR THORACIC SPINE
LOCATION DETAILED: RIGHT MID-UPPER BACK
LOCATION DETAILED: SUPERIOR THORACIC SPINE

## 2023-11-21 ASSESSMENT — LOCATION ZONE DERM: LOCATION ZONE: TRUNK

## 2023-11-21 ASSESSMENT — LOCATION SIMPLE DESCRIPTION DERM
LOCATION SIMPLE: LOWER BACK
LOCATION SIMPLE: UPPER BACK
LOCATION SIMPLE: RIGHT UPPER BACK

## 2023-11-21 NOTE — PROCEDURE: COUNSELING
Detail Level: Detailed
Patient Specific Counseling (Will Not Stick From Patient To Patient): Patient's RPC seems to be responding to daily use of Vaseline. Most of her old lesions have resolved. However, she is getting a few new lesions. Patient encouraged to try to increase Vaseline (or Vanicream Moisturizing Ointment) to twice daily. Discussed elbow and knee protectors. Patient has purchased these although they are problematic to put on and take off as she does not have consistent help. Discussed that leflunomide may still be the cause of her RPC; she was educated that it can take a few months of being off the medication before knowing with more certainty. \\nDiscussed follow up. Patient prefers not to return to clinic unless her RPC fails to continue to improve or if it worsens.
Detail Level: Simple
Patient Specific Counseling (Will Not Stick From Patient To Patient): Patient may add clobetasol twice daily x 2weeks, then reduce to twice weekly. She was informed of adverse effects of overuse, including atrophy.

## 2023-11-21 NOTE — PROCEDURE: ADDITIONAL NOTES
Detail Level: Detailed
Render Risk Assessment In Note?: no
Additional Notes: Patient RPC's is no longer itchy. In fact, patient reports her nostalgia paresthetica bothers her more.

## 2023-11-21 NOTE — HPI: FOLLOW UP OTHER
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 These bumps occur because skin\\nmaterial is perforating from the dermis into the epidermis. The condition is chronic and difficult to treat.\\nContact office if: Perforating Disorder worsens.\\nPlan: Other.\\nPatient states she has been on leflunomide and hydroxychloroquine for about one year. The most recent\\nmedication the patient started is sulfasalazine, which was stared after her rash appeared. Discussed patient's\\npathology. Patient counseled that Reactive Perforating Collagenosis (RPC) is a rare entity. Discussed that it is\\nusually in association with diabetes or kidney disease, neither of which patient has. Discussed the possibility that\\nher RPC may be drug induced. If it is, the most likely causative drug is leflunomide. Discussed koebnerization.\\nPatient has developed these lesions in old scars (from the 1990's) and in recent areas where she has had trauma\\n(scratching her forearm, rubbing her fingers against things, scraping her elbows against walls). Discussed that\\ntreatment can be difficult and treatment options can include (but are not limited to) cryotherapy, emollients, topical\\nkeratolytics, topical steroids (these did not work for patient) tretinon, oral retinoids, and methotrexate. Patient's\\nRPC seems to be responding to three times daily use of Vaseline. Recommend she continue this. Also\\nrecommend she purchase knee and elbow protectors as avoiding trauma to the skin is the mainstay of therapy.\\nDiscussed that patient may want to consider moving into an assisted living facility or hire home health care so she\\ncan have assistance putting protectors and Vaseline on. For now, as lesions appear to be responding to Vaseline\\n(and with the hopeful avoidance of future trauma) will not recommend that patient stop leflunomide. However, if\\nher lesions fail to continue to respond to therapy and/or if she develops news lesions, then discontinuing\\nleflunomide may need to be considered.\"